# Patient Record
Sex: MALE | Race: BLACK OR AFRICAN AMERICAN | Employment: OTHER | URBAN - METROPOLITAN AREA
[De-identification: names, ages, dates, MRNs, and addresses within clinical notes are randomized per-mention and may not be internally consistent; named-entity substitution may affect disease eponyms.]

---

## 2017-08-10 ENCOUNTER — HOSPITAL ENCOUNTER (EMERGENCY)
Age: 32
Discharge: HOME | End: 2017-08-10
Attending: EMERGENCY MEDICINE
Payer: SELF-PAY

## 2017-08-10 VITALS
WEIGHT: 215.83 LBS | SYSTOLIC BLOOD PRESSURE: 137 MMHG | HEIGHT: 69 IN | RESPIRATION RATE: 16 BRPM | BODY MASS INDEX: 31.97 KG/M2 | OXYGEN SATURATION: 100 % | DIASTOLIC BLOOD PRESSURE: 66 MMHG | HEART RATE: 90 BPM | TEMPERATURE: 98.2 F
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
## 2017-08-10 PROCEDURE — 74011250637 HC RX REV CODE- 250/637: Performed by: PHYSICIAN ASSISTANT

## 2017-08-10 PROCEDURE — 74011636637 HC RX REV CODE- 636/637: Performed by: PHYSICIAN ASSISTANT

## 2017-08-10 PROCEDURE — 99284 EMERGENCY DEPT VISIT MOD MDM: CPT

## 2017-08-10 RX ORDER — PREDNISONE 10 MG/1
20 TABLET ORAL
Status: COMPLETED | OUTPATIENT
Start: 2017-08-10 | End: 2017-08-10

## 2017-08-10 RX ORDER — PREDNISONE 10 MG/1
TABLET ORAL
Qty: 21 TAB | Refills: 0 | Status: SHIPPED | OUTPATIENT
Start: 2017-08-10 | End: 2018-11-11

## 2017-08-10 RX ORDER — DIPHENHYDRAMINE HCL 25 MG
25 CAPSULE ORAL
Qty: 20 CAP | Refills: 0 | Status: SHIPPED | OUTPATIENT
Start: 2017-08-10 | End: 2017-08-15

## 2017-08-10 RX ORDER — FAMOTIDINE 20 MG/1
20 TABLET, FILM COATED ORAL
Status: COMPLETED | OUTPATIENT
Start: 2017-08-10 | End: 2017-08-10

## 2017-08-10 RX ORDER — DIPHENHYDRAMINE HCL 25 MG
25 CAPSULE ORAL
Status: COMPLETED | OUTPATIENT
Start: 2017-08-10 | End: 2017-08-10

## 2017-08-10 RX ORDER — FAMOTIDINE 20 MG/1
20 TABLET, FILM COATED ORAL 2 TIMES DAILY
Qty: 10 TAB | Refills: 0 | Status: SHIPPED | OUTPATIENT
Start: 2017-08-10 | End: 2017-08-15

## 2017-08-10 RX ORDER — MUPIROCIN 20 MG/G
OINTMENT TOPICAL 3 TIMES DAILY
Qty: 22 G | Refills: 0 | Status: SHIPPED | OUTPATIENT
Start: 2017-08-10 | End: 2018-11-11

## 2017-08-10 RX ADMIN — FAMOTIDINE 20 MG: 20 TABLET ORAL at 16:46

## 2017-08-10 RX ADMIN — DIPHENHYDRAMINE HYDROCHLORIDE 25 MG: 25 CAPSULE ORAL at 16:46

## 2017-08-10 RX ADMIN — PREDNISONE 20 MG: 10 TABLET ORAL at 16:46

## 2017-08-10 NOTE — ED NOTES
LINDY Roberts at bedside to provide discharge paperwork. Vital signs stable. Pt in no apparent distress at this time. Mental status at baseline. Ambulatory to waiting room with steady gate, discharge paperwork in hand. Accompanied by self.

## 2017-08-10 NOTE — ED TRIAGE NOTES
Patient arrives ambulatory to ED stating he was stung 8 times by some wasps yesterday, patient states they are sore today. Patient denies chest pain and shortness of breath.

## 2017-08-10 NOTE — DISCHARGE INSTRUCTIONS
Thank you for allowing us to provide you with excellent care today. We hope we addressed all of your concerns and needs. We strive to provide excellent quality care in the Emergency Department. Please rate us as excellent, as anything less than excellent does not meet our expectations. If you feel that you have not received excellent quality care or timely care, please ask to speak to the nurse manager. Please choose us in the future for your continued health care needs. The exam and treatment you received in the Emergency Department were for an urgent problem and are not intended as complete care. It is important that you follow-up with a doctor, nurse practitioner, or physician assistant to:  (1) confirm your diagnosis,  (2) re-evaluation of changes in your illness and treatment, and  (3) for ongoing care. If your symptoms become worse or you do not improve as expected and you are unable to reach your usual health care provider, you should return to the Emergency Department. We are available 24 hours a day. Take this sheet with you when you go to your follow-up visit. If you have any problem arranging the follow-up visit, contact [address] [place] [address] 21 [phone])    Make an appointment with your Primary Care doctor for follow up of this visit. Return to the ER if you are unable to be seen in the time recommended on your discharge instructions.

## 2017-08-10 NOTE — ED PROVIDER NOTES
HPI Comments: Ary Casanova is a 29 y.o. male with no PMHx who presents ambulatory to the ED c/o R arm itching, swelling, and pain since yesterday. Pt reports he was stung by multiple hornets after finding a hornet nest under the hdz of his car. He notes initially visualizing five lumps. Pt states his R hand feels cramped. He notes taking benadryl, last dose 9:30 AM, without relief. Pt specifically denies any drainage from site, fever, chills, sore throat, rhinorrhea, SOB, CP, HA, lightheadedness, dizziness, abdominal pain, N/V/D. Social hx: +1ppd Tobacco use, - EtOH use, - Illicit drug use    PCP: None    There are no other complaints, changes or physical findings at this time. The history is provided by the patient. No  was used. History reviewed. No pertinent past medical history. History reviewed. No pertinent surgical history. History reviewed. No pertinent family history. Social History     Social History    Marital status: SINGLE     Spouse name: N/A    Number of children: N/A    Years of education: N/A     Occupational History    Not on file. Social History Main Topics    Smoking status: Current Every Day Smoker     Packs/day: 1.00     Years: 10.00    Smokeless tobacco: Never Used    Alcohol use No    Drug use: No    Sexual activity: Not on file     Other Topics Concern    Not on file     Social History Narrative         ALLERGIES: Tylenol-codeine #3 [acetaminophen-codeine]    Review of Systems   Constitutional: Negative for chills, diaphoresis and fever. HENT: Negative for rhinorrhea and sore throat. Eyes: Negative for pain. Respiratory: Negative for shortness of breath, wheezing and stridor. Cardiovascular: Negative for chest pain and leg swelling. Gastrointestinal: Negative for abdominal pain, diarrhea, nausea and vomiting. Genitourinary: Negative for difficulty urinating, dysuria, flank pain and hematuria.    Musculoskeletal: Positive for myalgias (R hand). Negative for back pain and neck stiffness. Skin: Negative for rash. Positive for multiple insect stings to R arm. Neurological: Negative for dizziness, seizures, syncope, weakness, light-headedness and headaches. Psychiatric/Behavioral: Negative for behavioral problems and confusion. Physical Exam   Constitutional: He is oriented to person, place, and time. He appears well-developed and well-nourished. No distress. HENT:   Head: Normocephalic and atraumatic. Right Ear: External ear normal.   Left Ear: External ear normal.   Nose: Nose normal.   Eyes: Conjunctivae and EOM are normal. Right eye exhibits no discharge. Left eye exhibits no discharge. No scleral icterus. Neck: Normal range of motion. Neck supple. Cardiovascular: Normal rate, regular rhythm, normal heart sounds and intact distal pulses. No murmur heard. Pulmonary/Chest: Effort normal and breath sounds normal. No stridor. No respiratory distress. He has no decreased breath sounds. He has no wheezes. Abdominal: Soft. Bowel sounds are normal. He exhibits no distension. There is no tenderness. There is no rebound. Musculoskeletal: Normal range of motion. He exhibits no edema or tenderness. Neurological: He is alert and oriented to person, place, and time. Skin: Skin is warm and dry. No rash noted. He is not diaphoretic. R forearm: diffuse swelling. Small amount of erythema. No fluctuance or induration. Several insect stings present without bleeding or drainage. 2+ radial pulse. NVI. Good  strength. Psychiatric: He has a normal mood and affect. Judgment normal.   Nursing note and vitals reviewed. MDM  Number of Diagnoses or Management Options  Insect bite, initial encounter:   Diagnosis management comments: DDx: insect bite, cellulitis, foreign body, abscess    Patient presents s/p hornet stings to R arm one day ago. Localized swelling with no stinger in place.  No abscess, cellulitis, or worrisome systemic process. Presentation consistent with localized reaction to insect bite. Recommend anti-histamine and bactroban as needed. F/u with PCP in 2-3 days. Amount and/or Complexity of Data Reviewed  Review and summarize past medical records: yes    Patient Progress  Patient progress: stable    ED Course       Procedures    Chief Complaint   Patient presents with    Bee sting     patient was stung by 8 hornets yesterday and c/o R arm redness and swelling. MEDICATIONS GIVEN:  Medications   diphenhydrAMINE (BENADRYL) capsule 25 mg (25 mg Oral Given 8/10/17 1646)   famotidine (PEPCID) tablet 20 mg (20 mg Oral Given 8/10/17 1646)   predniSONE (DELTASONE) tablet 20 mg (20 mg Oral Given 8/10/17 1646)         VITAL SIGNS:  Patient Vitals for the past 12 hrs:   Temp Pulse Resp BP SpO2   08/10/17 1559 98.2 °F (36.8 °C) 90 16 137/66 100 %     PROGRESS NOTES:  4:25 PM  Initial assessment of patient complete, and patient was given the opportunity to ask questions. Plan of care reviewed with patient and will update when results are available. 4:48 PM  I have just reevaluated the patient. I have reviewed his vital signs and determined there is currently no worsening in their condition or physical exam. Results have been reviewed with them and their questions have been answered. 4:48 PM  I have reviewed discharge instructions with the patient and explained medications that he is being discharged with. The patient verbalized understanding and agrees with plan. DIAGNOSIS:    1. Insect bite, initial encounter        PLAN:  1. Discharge home  2. Medications as directed  3. F/u with PCP in 2-3 days  4.  Return precautions reviewed    Follow-up Information     Follow up With Details Comments Contact Info    Establish primary care physician       [facility]  As needed, If symptoms worsen [address] [place] [address]  [phone]        Discharge Medication List as of 8/10/2017  4:26 PM      START taking these medications    Details   diphenhydrAMINE (BENADRYL) 25 mg capsule Take 1 Cap by mouth every six (6) hours as needed for up to 5 days. , Normal, Disp-20 Cap, R-0      famotidine (PEPCID) 20 mg tablet Take 1 Tab by mouth two (2) times a day for 5 days. , Normal, Disp-10 Tab, R-0      predniSONE (STERAPRED DS) 10 mg dose pack Take as directed, Normal, Disp-21 Tab, R-0      mupirocin (BACTROBAN) 2 % ointment Apply  to affected area three (3) times daily. Apply to area for 10 days, Normal, Disp-22 g, R-0         CONTINUE these medications which have NOT CHANGED    Details   ibuprofen (MOTRIN) 600 mg tablet Take 1 Tab by mouth every six (6) hours as needed for Pain., Print, Disp-20 Tab, R-0               ED COURSE: The patients hospital course has been uncomplicated. DISCHARGE NOTE:  4:50 PM  The care plan has been outline with the patient and/or family, who verbally conveyed understanding and agreement. Available results have been reviewed. Patient and/or family understand the follow up plan as outlined and discharge instructions. Should their condition deterioration at any time after discharge the patient agrees to return, follow up sooner than outlined or seek medical assistance at the closest Emergency Room as soon as possible. Questions have been answered. Discharge instructions and educational information regarding the patient's diagnosis as well a list of reasons why the patient would want to seek immediate medical attention, should their condition change, were reviewed directly with the patient/family         This note will not be viewable in [facility]. This note is prepared by Matilda Snyder, acting as Scribe for LINDY Joshi. LINDY Joshi: The scribe's documentation has been prepared under my direction and personally reviewed by me in its entirety.  I confirm that the note above accurately reflects all work, treatment, procedures, and medical decision making performed by me.

## 2018-11-11 ENCOUNTER — HOSPITAL ENCOUNTER (EMERGENCY)
Age: 33
Discharge: HOME | End: 2018-11-11
Attending: EMERGENCY MEDICINE
Payer: SELF-PAY

## 2018-11-11 VITALS
OXYGEN SATURATION: 99 % | SYSTOLIC BLOOD PRESSURE: 156 MMHG | TEMPERATURE: 62 F | WEIGHT: 219.58 LBS | BODY MASS INDEX: 31.44 KG/M2 | HEIGHT: 70 IN | RESPIRATION RATE: 16 BRPM | DIASTOLIC BLOOD PRESSURE: 85 MMHG

## 2018-11-11 DIAGNOSIS — B02.9 HERPES ZOSTER WITHOUT COMPLICATION: Primary | ICD-10-CM

## 2018-11-11 PROCEDURE — 99282 EMERGENCY DEPT VISIT SF MDM: CPT

## 2018-11-11 RX ORDER — PREDNISONE 10 MG/1
TABLET ORAL
Qty: 21 TAB | Refills: 0 | Status: SHIPPED | OUTPATIENT
Start: 2018-11-11 | End: 2019-01-13

## 2018-11-11 RX ORDER — VALACYCLOVIR HYDROCHLORIDE 1 G/1
1000 TABLET, FILM COATED ORAL 3 TIMES DAILY
Qty: 21 TAB | Refills: 0 | Status: SHIPPED | OUTPATIENT
Start: 2018-11-11 | End: 2018-11-18

## 2018-11-11 RX ORDER — HYDROCODONE BITARTRATE AND ACETAMINOPHEN 5; 325 MG/1; MG/1
1 TABLET ORAL
Qty: 10 TAB | Refills: 0 | Status: SHIPPED | OUTPATIENT
Start: 2018-11-11 | End: 2019-01-13

## 2018-11-12 NOTE — ED NOTES
Patient discharged by LINDY Gtz. Patient provided with discharge instructions Rx and instructions on follow up care. Patient out of ED ambulatory accompanied by self.

## 2018-11-12 NOTE — DISCHARGE INSTRUCTIONS
[facility] SYSTEMS Departments     For adult and child immunizations, family planning, TB screening, STD testing and women's health services. [facility]: [place] [phone]      [facility] 25   [address]   [address]   170 [facility]: Juany Soriano [address] [phone]      [address]          [place] [place] [address]     For primary care services, woman and child wellness, and some clinics providing specialty care. [facility] -- [address] [facility] [address] 25[address] [facility] [phone]   411 [facility] 200 [facility] 3614 [facility] [phone]   339 [facility] [address] [phone]   [address]  [employer] [address] [place] [address] 5850  Community  [phone]   [address] [place] [address] [address] [place] [phone]   [facility] 81 [facility] [phone]   [facility] [place] 1051 [facility] [phone]   [facility]: [facility] [address] [place] [address], [address] [phone]     [facility] [address] [facility] [address] [phone]   [facility] 5850 [facility]  [phone]   [employer]  [address], [place] [address] ([email or web address]) [phone]         Sexual Health/Woman Wellness Clinics    For STD/HIV testing and treatment, pregnancy testing and services, men's health, birth control services, LGBT services, and hepatitis/HPV vaccine services. [facility] for [place] [employer] [address] [facility] [address] [facility] [address] [facility] 1579 [address] [phone]   [facility] [address], 5th floor [phone]   Pregnancy [address] [facility] Women [address]  Elia Lloyd [phone]         Specialty Service [address]    [phone]   [place]   [phone]   Women, Infant and Children's Services: [address] [phone] 600 [facility]   [phone]   [address]   [facility]     [phone]   [address] Crisis   [address] [phone]     Local Primary Care Physicians  [facility] [phone]  MD Bouchra Bazan MD Raymundo Percy, MD [facility] [phone]  Angelique Marquez, P  MD Raine Luong MD Charmel Putt, MD [address] [address] [phone]  Mojgan Shafer, MD Erika Espinoza MD [address] [facility] [phone]  MD Francisco Soler, MD Greyson Berkowitz MD   [facility] [phone]  VTZ PMSPTM CHEO, MD Jasmin Pastrana, NP [address] [phone]  Ena Shutter, MD Dairl Deter, MD Gilda Hamman, MD Erven Mariscal, MD Colvin Grate, MD Rhoda Furl, MD Achilles Nephew, MD   [address] 57 [facility]  Estefanía Mendoza MD [address] [facility] [address] [phone]  MD Leigha Holcomb, NP  Piedad Salas, MD Benjamin Webber MD Laural Reap, MD Sherill Farm, MD   2919 [facility] [phone]  MD Shiela Mane, P  Edmundo Kendall, NP  MD Sue Junior MD Rachele Copping, MD Burnadette Sera, MD [facility] [phone]  MD Shellie Henderson MD Kristene Freer, MD Felice Lobe, MD Wilford Mackie, MD   [address] [phone]  MD Josephine Song, MD [place] [phone]  MD Shannan Villalba MD [place] [facility], [place], [address] [facility] [phone]  Kay Crow, MD Fransico Berg, MD Amado Bradshaw, MD Jane Waddell, MD Edin Valle, MD Michelle Olmstead, OSBALDO Landis MD 1619 [facility]   [phone]  MD Margaret Tellez, MD Demetria Up MD   2102 [facility] [phone]  Pamela Esqueda, MD Crow Dugan, P  Radha Duval, PA-C  Radha Duval, FNP  Aleksey Miller, PA-C  Jenni Calloway, MD Jaren Rodrigez, NP   Salazar Baez, DO Miscellaneous:  Alexa Oneal [phone]

## 2018-11-12 NOTE — ED PROVIDER NOTES
EMERGENCY DEPARTMENT HISTORY AND PHYSICAL EXAM 
 
 
Date: 11/11/2018 Patient Name: Saintclair Ganja History of Presenting Illness Chief Complaint Patient presents with  Insect Bite  
  reports that he thinks he got bit by a spider x 1.5 weeks on his forhead while working on a car, reports that he was seen at [facility] because \"it swolled up\" and was given Keflex and told to follow up if no improvement; pt states he is still feeling sx's and he is now having \"sharp pains\" in his temple History Provided By: Patient HPI: Saintclair Ganja, 34 y.o. male presents ambulatory to the ED with cc of 6 days of constant aching 5 out of 10 skin tenderness and bumps to the right forehead that is \"tender to touch\". No fever. No improvement with Keflex. No neck pain. No vision changes. Chief Complaint: rash Duration: 6  Days Timing:  Constant Location: right forehead Quality: Aching Severity: 6 out of 10 Modifying Factors: \"tender to touch\" Associated Symptoms: denies any other associated signs or symptoms There are no other complaints, changes, or physical findings at this time. PCP: None Past History Past Medical History: 
History reviewed. No pertinent past medical history. Past Surgical History: 
History reviewed. No pertinent surgical history. Family History: 
History reviewed. No pertinent family history. Social History: 
Social History Tobacco Use  Smoking status: Current Every Day Smoker Packs/day: 1.00 Years: 10.00 Pack years: 10.00  Smokeless tobacco: Never Used Substance Use Topics  Alcohol use: No  
 Drug use: Yes Frequency: 2.0 times per week Types: Marijuana Allergies: Allergies Allergen Reactions  Tylenol-Codeine #3 [Acetaminophen-Codeine] Other (comments)  
  headache Review of Systems Review of Systems Constitutional: Negative for fatigue and fever. HENT: Negative for congestion, ear pain and rhinorrhea. Eyes: Negative for pain and redness. Respiratory: Negative for cough and wheezing. Cardiovascular: Negative for chest pain and palpitations. Gastrointestinal: Negative for abdominal pain, nausea and vomiting. Genitourinary: Negative for dysuria, frequency and urgency. Musculoskeletal: Negative for back pain, neck pain and neck stiffness. Skin: Positive for rash. Negative for wound. Neurological: Negative for weakness, light-headedness, numbness and headaches. Physical Exam  
Physical Exam  
Constitutional: He is oriented to person, place, and time. He appears well-developed and well-nourished. Non-toxic appearance. No distress. HENT:  
Head: Normocephalic and atraumatic. Head is without right periorbital erythema and without left periorbital erythema. Right Ear: External ear normal.  
Left Ear: External ear normal.  
Nose: Nose normal.  
Mouth/Throat: Uvula is midline. No trismus in the jaw. FOREHEAD: 
Grouped vesicles on an erythematous base on the right forehead along a single dermatome without ocular involvement. Eyes: Conjunctivae and EOM are normal. Pupils are equal, round, and reactive to light. No scleral icterus. No ptosis or lid lag Able to close the eyelid tightly Neck: Normal range of motion and full passive range of motion without pain. Cardiovascular: Normal rate, regular rhythm and normal heart sounds. Pulmonary/Chest: Effort normal and breath sounds normal. No accessory muscle usage. No tachypnea. No respiratory distress. He has no decreased breath sounds. He has no wheezes. Abdominal: Soft. There is no tenderness. There is no rigidity and no guarding. Musculoskeletal: Normal range of motion. Neurological: He is alert and oriented to person, place, and time. He is not disoriented. No cranial nerve deficit or sensory deficit. GCS eye subscore is 4. GCS verbal subscore is 5. GCS motor subscore is 6. No focal neurological deficit Skin: Skin is intact. No rash noted. Psychiatric: He has a normal mood and affect. His speech is normal.  
Nursing note and vitals reviewed. Diagnostic Study Results Labs - No results found for this or any previous visit (from the past 12 hour(s)). Radiologic Studies - No orders to display CT Results  (Last 48 hours) None CXR Results  (Last 48 hours) None Medical Decision Making I am the first provider for this patient. I reviewed the vital signs, available nursing notes, past medical history, past surgical history, family history and social history. Vital Signs-Reviewed the patient's vital signs. Patient Vitals for the past 12 hrs: 
 Temp Resp BP SpO2  
11/11/18 2104 (!) 62 °F (16.7 °C) 16 156/85 99 % Pulse Oximetry Analysis - 99% on RA Records Reviewed: Nursing Notes, Old Medical Records and  Provider Notes (Medical Decision Making): DDx: HSV, cellulitis, insect bite ED Course:  
Initial assessment performed. The patients presenting problems have been discussed, and they are in agreement with the care plan formulated and outlined with them. I have encouraged them to ask questions as they arise throughout their visit. Disposition: 
Discharge PLAN: 
1. Current Discharge Medication List  
  
START taking these medications Details  
valACYclovir (VALTREX) 1 gram tablet Take 1 Tab by mouth three (3) times daily for 7 days. Qty: 21 Tab, Refills: 0  
  
predniSONE (STERAPRED DS) 10 mg dose pack Per Dose Pack instructions Qty: 21 Tab, Refills: 0 HYDROcodone-acetaminophen (NORCO) 5-325 mg per tablet Take 1 Tab by mouth every eight (8) hours as needed for Pain. Max Daily Amount: 3 Tabs. Qty: 10 Tab, Refills: 0 Associated Diagnoses: Herpes zoster without complication 2. Follow-up Information Follow up With Specialties Details Why Contact Keyur Wray  Schedule an appointment as soon as possible for a visit PRIMARY CARE: call to schedule follow up [place] [address] Return to ED if worse Diagnosis Clinical Impression: 1. Herpes zoster without complication

## 2019-01-13 ENCOUNTER — HOSPITAL ENCOUNTER (EMERGENCY)
Age: 34
Discharge: HOME | End: 2019-01-13
Attending: EMERGENCY MEDICINE
Payer: SELF-PAY

## 2019-01-13 VITALS
HEIGHT: 70 IN | TEMPERATURE: 97.9 F | SYSTOLIC BLOOD PRESSURE: 142 MMHG | OXYGEN SATURATION: 97 % | HEART RATE: 70 BPM | RESPIRATION RATE: 16 BRPM | DIASTOLIC BLOOD PRESSURE: 104 MMHG | BODY MASS INDEX: 31.56 KG/M2 | WEIGHT: 220.46 LBS

## 2019-01-13 DIAGNOSIS — R22.0 RIGHT FACIAL SWELLING: Primary | ICD-10-CM

## 2019-01-13 DIAGNOSIS — L03.211 CELLULITIS OF FACE: ICD-10-CM

## 2019-01-13 PROCEDURE — 74011000250 HC RX REV CODE- 250: Performed by: PHYSICIAN ASSISTANT

## 2019-01-13 PROCEDURE — 99283 EMERGENCY DEPT VISIT LOW MDM: CPT

## 2019-01-13 RX ORDER — MUPIROCIN 20 MG/G
OINTMENT TOPICAL 3 TIMES DAILY
Qty: 22 G | Refills: 0 | Status: SHIPPED | OUTPATIENT
Start: 2019-01-13

## 2019-01-13 RX ORDER — CEPHALEXIN 500 MG/1
500 CAPSULE ORAL 4 TIMES DAILY
Qty: 28 CAP | Refills: 0 | Status: SHIPPED | OUTPATIENT
Start: 2019-01-13 | End: 2019-01-20

## 2019-01-13 RX ORDER — SULFAMETHOXAZOLE AND TRIMETHOPRIM 800; 160 MG/1; MG/1
2 TABLET ORAL 2 TIMES DAILY
Qty: 28 TAB | Refills: 0 | Status: SHIPPED | OUTPATIENT
Start: 2019-01-13 | End: 2019-01-20

## 2019-01-13 RX ORDER — TETRACAINE HYDROCHLORIDE 5 MG/ML
1 SOLUTION OPHTHALMIC
Status: COMPLETED | OUTPATIENT
Start: 2019-01-13 | End: 2019-01-13

## 2019-01-13 RX ORDER — PREDNISONE 10 MG/1
TABLET ORAL
Qty: 21 TAB | Refills: 0 | Status: SHIPPED | OUTPATIENT
Start: 2019-01-13

## 2019-01-13 RX ADMIN — FLUORESCEIN SODIUM 1 STRIP: 1 STRIP OPHTHALMIC at 08:22

## 2019-01-13 RX ADMIN — TETRACAINE HYDROCHLORIDE 1 DROP: 5 SOLUTION OPHTHALMIC at 08:22

## 2019-01-13 NOTE — ED PROVIDER NOTES
EMERGENCY DEPARTMENT HISTORY AND PHYSICAL EXAM 
 
 
Date: 1/13/2019 Patient Name: Ese Jones History of Presenting Illness Chief Complaint Patient presents with  Eye Pain Ambulatory c/o R eye itching/swelling x today upon awakening Pt reports similar symptoms x 2 months ago History Provided By: Patient HPI: Ese Jones, 34 y.o. male with no significant PMHx presents ambulatory to the ED with cc of right sided facial and eye swelling. Patient states that the swelling started last night and this morning when he woke up he noticed some clear discharge from his right eye. Patient denies any changes in detergent, shampoo, food, or recent insect bites. Patient also denies fevers, chills, ear pain, eye pain, vision changes, nasal congestion, dental pain, sore throat, trouble swallowing, chest pain, SOB, abdominal pain or NVD. Patient states that he had a similar episode of symptoms November 2018. He states that he presented to this ED initially and was thought to have a shingles outbreak. Patient was started on prednisone and Valtrex but states that he did not have any improvement of his symptoms. 2 week after his visit he went to [facility] where they started him on another pack of prednisone and keflex. He states that after those medications he had resolution of the swelling. Patient denies any recent injury or facial trauma. He saw an Ophthalmologist last week and had a normal eye examination. He states that the swelling typically starts on the right side of his head and travels up toward the left side of his scalp. He denies any associated rash or pain with the swelling. No other complaints at this time. Chief Complaint: facial swelling Duration: 1 Days Timing:  Acute Location: right sided face Associated Symptoms: denies any other associated signs or symptoms There are no other complaints, changes, or physical findings at this time. PCP: None Current Outpatient Medications Medication Sig Dispense Refill  predniSONE (STERAPRED DS) 10 mg dose pack Take as directed 21 Tab 0  
 trimethoprim-sulfamethoxazole (BACTRIM DS) 160-800 mg per tablet Take 2 Tabs by mouth two (2) times a day for 7 days. 28 Tab 0  cephALEXin (KEFLEX) 500 mg capsule Take 1 Cap by mouth four (4) times daily for 7 days. 28 Cap 0  
 mupirocin (BACTROBAN) 2 % ointment Apply  to affected area three (3) times daily. Apply to area for 10 days 22 g 0 Past History Past Medical History: 
History reviewed. No pertinent past medical history. Past Surgical History: 
History reviewed. No pertinent surgical history. Family History: 
History reviewed. No pertinent family history. Social History: 
Social History Tobacco Use  Smoking status: Current Every Day Smoker Packs/day: 1.00 Years: 10.00 Pack years: 10.00  Smokeless tobacco: Never Used Substance Use Topics  Alcohol use: No  
 Drug use: Yes Frequency: 2.0 times per week Types: Marijuana Allergies: Allergies Allergen Reactions  Tylenol-Codeine #3 [Acetaminophen-Codeine] Other (comments)  
  headache Review of Systems Review of Systems Constitutional: Negative for chills, diaphoresis and fever. HENT: Positive for facial swelling. Negative for congestion, nosebleeds, rhinorrhea, sore throat and trouble swallowing. Eyes: Positive for discharge and itching. Negative for photophobia, pain, redness and visual disturbance. Respiratory: Negative for shortness of breath, wheezing and stridor. Cardiovascular: Negative for chest pain and leg swelling. Gastrointestinal: Negative for abdominal pain, diarrhea, nausea and vomiting. Genitourinary: Negative for difficulty urinating, dysuria, flank pain and hematuria. Musculoskeletal: Negative for back pain and neck stiffness. Skin: Negative for rash.   
Neurological: Negative for dizziness, seizures, syncope, weakness, light-headedness and headaches. Psychiatric/Behavioral: Negative for behavioral problems and confusion. Physical Exam  
Physical Exam  
Constitutional: He is oriented to person, place, and time. He appears well-developed and well-nourished. No distress. HENT:  
Head: Normocephalic and atraumatic. Right Ear: Hearing, tympanic membrane, external ear and ear canal normal. No mastoid tenderness. Tympanic membrane is not injected. No middle ear effusion. No hemotympanum. Left Ear: Hearing, tympanic membrane, external ear and ear canal normal. No mastoid tenderness. Tympanic membrane is not injected. No middle ear effusion. No hemotympanum. Nose: No mucosal edema. Mouth/Throat: Uvula is midline. No trismus in the jaw. No dental abscesses or uvula swelling. No oropharyngeal exudate, posterior oropharyngeal edema, posterior oropharyngeal erythema or tonsillar abscesses. Eyes: Conjunctivae and EOM are normal. Pupils are equal, round, and reactive to light. Right eye exhibits no chemosis, no discharge, no exudate and no hordeolum. No foreign body present in the right eye. Left eye exhibits no chemosis, no discharge, no exudate and no hordeolum. No foreign body present in the left eye. Right conjunctiva is not injected. Left conjunctiva is not injected. Slit lamp exam: The right eye shows no corneal abrasion, no corneal flare, no corneal ulcer, no foreign body, no hyphema, no hypopyon, no fluorescein uptake and no anterior chamber bulge. RIGHT EYE: Upper periorbital edema with erythema. No drainage. FROM of the eyes without difficulty VA: 
OS: 20/16 OD: 20/25 OU: 20/16 Neck: Normal range of motion. Neck supple. Cardiovascular: Normal rate, regular rhythm and normal heart sounds. No murmur heard. Pulmonary/Chest: Effort normal and breath sounds normal. He has no decreased breath sounds. He has no wheezes. Abdominal: Soft. Bowel sounds are normal. He exhibits no distension.  There is no tenderness. There is no guarding. Musculoskeletal: Normal range of motion. He exhibits no edema or tenderness. Neurological: He is alert and oriented to person, place, and time. He has normal strength. No cranial nerve deficit or sensory deficit. GCS eye subscore is 4. GCS verbal subscore is 5. GCS motor subscore is 6. Skin: Skin is warm and dry. No rash noted. He is not diaphoretic. Psychiatric: He has a normal mood and affect. His behavior is normal. Judgment normal.  
Nursing note and vitals reviewed. Diagnostic Study Results Labs - No results found for this or any previous visit (from the past 12 hour(s)). Radiologic Studies - Medical Decision Making I am the first provider for this patient. I reviewed the vital signs, available nursing notes, past medical history, past surgical history, family history and social history. Vital Signs-Reviewed the patient's vital signs. Patient Vitals for the past 12 hrs: 
 Temp Pulse Resp BP SpO2  
01/13/19 0807 97.9 °F (36.6 °C) 70 16 (!) 142/104 97 % Records Reviewed: Nursing Notes and Old Medical Records Provider Notes (Medical Decision Making): DDx: cellulitis, insect bite, dermatitis, folliculitis, herpes zoster, impetigo, staph infection. Patient presents with right sided facial swelling starting last night. Has a history of similar complaint in November 2018. Denies any new exposures. Patient reports he had resolution of symptoms with prednisone and abx. Will assess ED Course:  
Initial assessment performed. The patients presenting problems have been discussed, and they are in agreement with the care plan formulated and outlined with them. I have encouraged them to ask questions as they arise throughout their visit. Procedure Note - Scott-pen Exam: 
8:40 AM  
Performed by Mercy Snow PA-C: 
Pt's intraocular pressure in his right eye was checked using a scott-pen. Prior to procedure scott-pen was calibrated and reset for accurate measurement. Pressure was 21 mmHg in his right eye. The procedure took 1-15 minutes, and pt tolerated well. Procedure Note - Scott-pen Exam: 
8:46 AM  
Performed by Edy Yo PA-C: 
Pt's intraocular pressure in his left eye was checked using a scott-pen. Prior to procedure scott-pen was calibrated and reset for accurate measurement. Pressure was 20 mmHg in his left eye. The procedure took 1-15 minutes, and pt tolerated well. Procedure Note - Wood's lamp exam: 
8:50 AM 
Performed by: Edy Yo PA-C Pts right eye was anesthetized with tetracaine, stained with fluorescein, and examined with a Wood's lamp, using lid eversion. Foreign body: no 
Fluorescein uptake: no The procedure took 10 minutes, and pt tolerated well. Disposition: 
DISCHARGE NOTE: 
9:05 AM 
The care plan has been outline with the patient and/or family, who verbally conveyed understanding and agreement. Available results have been reviewed. Patient and/or family understand the follow up plan as outlined and discharge instructions. Should their condition deterioration at any time after discharge the patient agrees to return, follow up sooner than outlined or seek medical assistance at the closest Emergency Room as soon as possible. Questions have been answered. Discharge instructions and educational information regarding the patient's diagnosis as well a list of reasons why the patient would want to seek immediate medical attention, should their condition change, were reviewed directly with the patient/family PLAN: 
1. Discharge home 2. Medications as directed 3. Schedule f/u with Dermatology 4. Return precautions reviewed Discharge Medication List as of 1/13/2019  8:59 AM  
  
START taking these medications  Details  
predniSONE (STERAPRED DS) 10 mg dose pack Take as directed, Normal, Disp-21 Tab, R-0  
  
 trimethoprim-sulfamethoxazole (BACTRIM DS) 160-800 mg per tablet Take 2 Tabs by mouth two (2) times a day for 7 days. , Normal, Disp-28 Tab, R-0  
  
cephALEXin (KEFLEX) 500 mg capsule Take 1 Cap by mouth four (4) times daily for 7 days. , Normal, Disp-28 Cap, R-0  
  
mupirocin (BACTROBAN) 2 % ointment Apply  to affected area three (3) times daily. Apply to area for 10 days, Normal, Disp-22 g, R-0  
  
  
 
 
5.  
Follow-up Information Follow up With Specialties Details Why Contact Info [facility] D[facility]  Schedule an appointment as soon as possible for a visit  [address] [place] [address] [address] [facility] [address] [phone] [facility] EMERGENCY DEPT Emergency Medicine  As needed, If symptoms worsen [address] [place] [address] [address] [facility] 
[phone] Return to ED if worse Diagnosis Clinical Impression: 1. Right facial swelling 2. Cellulitis of face This note will not be viewable in [address]

## 2019-01-13 NOTE — ED NOTES
LINDY Novak reviewed discharge instructions with the patient. The patient verbalized understanding. All questions and concerns were addressed. The patient declined a wheelchair and is discharged ambulatory in the care of family members with instructions and prescriptions in hand. Pt is alert and oriented x 4. Respirations are clear and unlabored.

## 2019-01-13 NOTE — DISCHARGE INSTRUCTIONS
Thank you!     Thank you for allowing us to provide you with excellent care today. We hope we addressed all of your concerns and needs. We strive to provide excellent quality care in the Emergency Department. You will receive a survey after your visit to evaluate the care you were provided.      Please rate us a level 5 (excellent), as anything less than excellent does not meet our goals.      If you feel that you have not received excellent quality care or timely care, please ask to speak to the nurse manager. Please choose us in the future for your continued health care needs. ______________________________________________________________________    The exam and treatment you received in the Emergency Department were for an urgent problem and are not intended as complete care. It is important that you follow-up with a doctor, nurse practitioner, or physician assistant to:  (1) confirm your diagnosis,  (2) re-evaluation of changes in your illness and treatment, and  (3) for ongoing care. If your symptoms become worse or you do not improve as expected and you are unable to reach your usual health care provider, you should return to the Emergency Department. We are available 24 hours a day. Take this sheet with you when you go to your follow-up visit. If you have any problem arranging the follow-up visit, contact [address] [place] [address] [phone])    Make an appointment with your Primary Care doctor for follow up of this visit. Return to the ER if you are unable to be seen in the time recommended on your discharge instructions.

## 2019-02-11 ENCOUNTER — HOSPITAL ENCOUNTER (EMERGENCY)
Age: 34
Discharge: HOME | End: 2019-02-11
Attending: EMERGENCY MEDICINE
Payer: SELF-PAY

## 2019-02-11 VITALS
RESPIRATION RATE: 18 BRPM | DIASTOLIC BLOOD PRESSURE: 83 MMHG | OXYGEN SATURATION: 99 % | TEMPERATURE: 97.7 F | SYSTOLIC BLOOD PRESSURE: 161 MMHG | BODY MASS INDEX: 32.88 KG/M2 | WEIGHT: 222 LBS | HEIGHT: 69 IN | HEART RATE: 59 BPM

## 2019-02-11 DIAGNOSIS — B02.9 HERPES ZOSTER WITHOUT COMPLICATION: Primary | ICD-10-CM

## 2019-02-11 PROCEDURE — 99282 EMERGENCY DEPT VISIT SF MDM: CPT

## 2019-02-11 RX ORDER — PREDNISONE 10 MG/1
TABLET ORAL
Qty: 21 TAB | Refills: 0 | Status: SHIPPED | OUTPATIENT
Start: 2019-02-11

## 2019-02-11 RX ORDER — VALACYCLOVIR HYDROCHLORIDE 1 G/1
1000 TABLET, FILM COATED ORAL 3 TIMES DAILY
Qty: 21 TAB | Refills: 0 | Status: SHIPPED | OUTPATIENT
Start: 2019-02-11 | End: 2019-02-18

## 2019-02-11 NOTE — ED PROVIDER NOTES
EMERGENCY DEPARTMENT HISTORY AND PHYSICAL EXAM 
 
 
Date: 2/11/2019 Patient Name: Gustabo To History of Presenting Illness Chief Complaint Patient presents with  Eye Swelling  
  c/o right eye swelling over the past few weeks; multiple visits for the same. Denies changes in vision or denies pain. History Provided By: Patient HPI: Gustabo To, 30 y.o. male presents ambulatory to the ED with cc of several months of moderately severe intermittent right eye lid swelling and itching that does seem to improve with medications prescribed and not associated with fever, injury or vision changes. He tells me the first time he was seen he was told it was shingles and was prescribed oral steroids and another medicine and it got better but then returned. He was evaluated by the eye doctor at [facility] clinic and told his eye was fine. He was seen several weeks later in this ED and prescribed steroids and oral antibiotics and his sympotms improved. He tells me his symptoms always start with a \"lump\" on the middle of his forehead and some tingling and he knows his eye will be swollen the next day. His symptoms for this presentation has the same prodrome. There are no other complaints, changes, or physical findings at this time. PCP: None Current Outpatient Medications Medication Sig Dispense Refill  valACYclovir (VALTREX) 1 gram tablet Take 1 Tab by mouth three (3) times daily for 7 days. 21 Tab 0  predniSONE (STERAPRED DS) 10 mg dose pack Per Dose Pack instructions 21 Tab 0  predniSONE (STERAPRED DS) 10 mg dose pack Take as directed 21 Tab 0  
 mupirocin (BACTROBAN) 2 % ointment Apply  to affected area three (3) times daily. Apply to area for 10 days 22 g 0 Past History Past Medical History: 
History reviewed. No pertinent past medical history. Past Surgical History: 
History reviewed. No pertinent surgical history. Family History: History reviewed. No pertinent family history. Social History: 
Social History Tobacco Use  Smoking status: Current Every Day Smoker Packs/day: 1.00 Years: 10.00 Pack years: 10.00  Smokeless tobacco: Never Used Substance Use Topics  Alcohol use: No  
 Drug use: Yes Frequency: 2.0 times per week Types: Marijuana Allergies: Allergies Allergen Reactions  Tylenol-Codeine #3 [Acetaminophen-Codeine] Other (comments)  
  headache Review of Systems Review of Systems Constitutional: Negative for fatigue and fever. HENT: Negative for congestion, ear pain and rhinorrhea. Eyes: Negative for photophobia, pain, discharge, redness, itching and visual disturbance. Respiratory: Negative for cough and wheezing. Cardiovascular: Negative for chest pain and palpitations. Gastrointestinal: Negative for abdominal pain, nausea and vomiting. Genitourinary: Negative for dysuria, frequency and urgency. Musculoskeletal: Negative for back pain, neck pain and neck stiffness. Skin: Negative for rash and wound. Right eyelid swelling and irritation / itching  of right forehead and eyelid Neurological: Negative for weakness, light-headedness, numbness and headaches. Physical Exam  
Physical Exam  
Constitutional: He is oriented to person, place, and time. He appears well-developed and well-nourished. Non-toxic appearance. No distress. HENT:  
Head: Normocephalic and atraumatic. Head is without right periorbital erythema and without left periorbital erythema. Right Ear: External ear normal.  
Left Ear: External ear normal.  
Nose: Nose normal.  
Mouth/Throat: Uvula is midline. No trismus in the jaw. FOREHEAD: 
No vesicular lesions but mild hyperpigmentation of the skin of the right forehead. Mild periorbital lid edema and mild erythema. Skin is mildly pruritic and non tender There is no crusting or discharge of the lids. There is no conjunctival edema or injection The sclera are white Eyes: Conjunctivae and EOM are normal. Pupils are equal, round, and reactive to light. No scleral icterus. Neck: Normal range of motion and full passive range of motion without pain. Cardiovascular: Normal rate, regular rhythm and normal heart sounds. Pulmonary/Chest: Effort normal and breath sounds normal. No accessory muscle usage. No tachypnea. No respiratory distress. He has no decreased breath sounds. He has no wheezes. Abdominal: Soft. There is no tenderness. There is no rigidity and no guarding. Musculoskeletal: Normal range of motion. Neurological: He is alert and oriented to person, place, and time. He is not disoriented. No cranial nerve deficit or sensory deficit. GCS eye subscore is 4. GCS verbal subscore is 5. GCS motor subscore is 6. Skin: Skin is intact. No rash noted. Psychiatric: He has a normal mood and affect. His speech is normal.  
Nursing note and vitals reviewed. Diagnostic Study Results Labs - No results found for this or any previous visit (from the past 12 hour(s)). Radiologic Studies - No orders to display CT Results  (Last 48 hours) None CXR Results  (Last 48 hours) None Medical Decision Making I am the first provider for this patient. I reviewed the vital signs, available nursing notes, past medical history, past surgical history, family history and social history. Vital Signs-Reviewed the patient's vital signs. Patient Vitals for the past 12 hrs: 
 Temp Pulse Resp BP SpO2  
02/11/19 0916 97.7 °F (36.5 °C) (!) 59 18 161/83 99 % Records Reviewed: Nursing Notes and Old Medical Records Provider Notes (Medical Decision Making): Afebrile; well appearing; no visual complaints or changes; prodrome, recurrence and unilateral presentation suggest herpetic process. I will prescribe Valtrex, oral steroids and refer to PCP/Ophtho. Return precautions. ED Course:  
Initial assessment performed. The patients presenting problems have been discussed, and they are in agreement with the care plan formulated and outlined with them. I have encouraged them to ask questions as they arise throughout their visit. Disposition: 
Discharge PLAN: 
1. Discharge Medication List as of 2/11/2019 11:04 AM  
  
START taking these medications Details  
valACYclovir (VALTREX) 1 gram tablet Take 1 Tab by mouth three (3) times daily for 7 days. , Normal, Disp-21 Tab, R-0  
  
!! predniSONE (STERAPRED DS) 10 mg dose pack Per Dose Pack instructions, Normal, Disp-21 Tab, R-0  
  
 !! - Potential duplicate medications found. Please discuss with provider. CONTINUE these medications which have NOT CHANGED Details ! ! predniSONE (STERAPRED DS) 10 mg dose pack Take as directed, Normal, Disp-21 Tab, R-0  
  
mupirocin (BACTROBAN) 2 % ointment Apply  to affected area three (3) times daily. Apply to area for 10 days, Normal, Disp-22 g, R-0  
  
 !! - Potential duplicate medications found. Please discuss with provider. 2.  
Follow-up Information Follow up With Specialties Details Why Contact Keyur Wray  Schedule an appointment as soon as possible for a visit PRIMARY CARE: call to [place] [address] 
[phone] Return to ED if worse Diagnosis Clinical Impression: 1. Herpes zoster without complication

## 2019-02-11 NOTE — LETTER
[address] 
[facility] EMERGENCY DEPT 
[address] [place] [address] [address] [id number] [phone] Work/School Note Date: 2/11/2019 To Whom It May concern: 
 
Amaury Méndez was seen and treated today in the emergency room by the following provider(s): 
Attending Provider: Juan Diego Zepeda MD 
Physician Assistant: LINDY Roth. Amaury Méndez may return to work on [id number]. Sincerely, LINDY Honeycutt